# Patient Record
Sex: FEMALE | Race: WHITE | NOT HISPANIC OR LATINO | URBAN - METROPOLITAN AREA
[De-identification: names, ages, dates, MRNs, and addresses within clinical notes are randomized per-mention and may not be internally consistent; named-entity substitution may affect disease eponyms.]

---

## 2023-01-09 ENCOUNTER — APPOINTMENT (OUTPATIENT)
Dept: URBAN - METROPOLITAN AREA SURGERY 17 | Age: 38
Setting detail: DERMATOLOGY
End: 2023-01-10

## 2023-01-09 VITALS
WEIGHT: 175 LBS | SYSTOLIC BLOOD PRESSURE: 114 MMHG | DIASTOLIC BLOOD PRESSURE: 70 MMHG | TEMPERATURE: 98.9 F | HEART RATE: 76 BPM | HEIGHT: 66 IN | RESPIRATION RATE: 12 BRPM

## 2023-01-09 DIAGNOSIS — D485 NEOPLASM OF UNCERTAIN BEHAVIOR OF SKIN: ICD-10-CM

## 2023-01-09 DIAGNOSIS — Z80.8 FAMILY HISTORY OF MALIGNANT NEOPLASM OF OTHER ORGANS OR SYSTEMS: ICD-10-CM

## 2023-01-09 PROBLEM — C44.41 BASAL CELL CARCINOMA OF SKIN OF SCALP AND NECK: Status: ACTIVE | Noted: 2023-01-09

## 2023-01-09 PROBLEM — D48.5 NEOPLASM OF UNCERTAIN BEHAVIOR OF SKIN: Status: ACTIVE | Noted: 2023-01-09

## 2023-01-09 PROCEDURE — 99203 OFFICE O/P NEW LOW 30 MIN: CPT | Mod: 25

## 2023-01-09 PROCEDURE — OTHER MOHS SURGERY: OTHER

## 2023-01-09 PROCEDURE — OTHER MIPS QUALITY: OTHER

## 2023-01-09 PROCEDURE — OTHER CONSULTATION FOR MOHS SURGERY: OTHER

## 2023-01-09 PROCEDURE — OTHER COUNSELING: OTHER

## 2023-01-09 PROCEDURE — 17311 MOHS 1 STAGE H/N/HF/G: CPT

## 2023-01-09 PROCEDURE — 11102 TANGNTL BX SKIN SINGLE LES: CPT | Mod: 59

## 2023-01-09 PROCEDURE — OTHER BIOPSY BY SHAVE METHOD WITH FROZEN SECTION: OTHER

## 2023-01-09 PROCEDURE — 88331 PATH CONSLTJ SURG 1 BLK 1SPC: CPT | Mod: 59

## 2023-01-09 ASSESSMENT — PAIN INTENSITY VAS: HOW INTENSE IS YOUR PAIN 0 BEING NO PAIN, 10 BEING THE MOST SEVERE PAIN POSSIBLE?: NO PAIN

## 2023-01-09 ASSESSMENT — LOCATION SIMPLE DESCRIPTION DERM: LOCATION SIMPLE: SCALP

## 2023-01-09 ASSESSMENT — LOCATION ZONE DERM: LOCATION ZONE: SCALP

## 2023-01-09 ASSESSMENT — LOCATION DETAILED DESCRIPTION DERM: LOCATION DETAILED: LEFT SUPERIOR PARIETAL SCALP

## 2023-01-09 NOTE — PROCEDURE: BIOPSY BY SHAVE METHOD WITH FROZEN SECTION
Scc Poorly Differentiated Histology Text: Atypical squamous cells (keratinocytes) are present. Nests and cords of tumor are invading the dermis. The tumor is poorly differentiated.

## 2023-01-09 NOTE — PROCEDURE: BIOPSY BY SHAVE METHOD WITH FROZEN SECTION
Scc Histology Text: Atypical squamous cells (keratinocytes) are present. Nests and cords of tumor are invading the dermis.

## 2023-01-09 NOTE — PROCEDURE: BIOPSY BY SHAVE METHOD WITH FROZEN SECTION
Bcc Superficial Histology Text: Atypical hyperchromatic basaloid cells are present. The pattern is superficial BCC along the dermal-epidermal junction with superficial buds of tumor coming off the basal cell layer of the epidermis.

## 2023-01-09 NOTE — PROCEDURE: BIOPSY BY SHAVE METHOD WITH FROZEN SECTION
Scc Well Differentiated Histology Text: Atypical squamous cells (keratinocytes) are present. Nests and cords of tumor are invading the dermis. The tumor is well differentiated.

## 2023-01-09 NOTE — PROCEDURE: BIOPSY BY SHAVE METHOD WITH FROZEN SECTION
Post-Care Instructions: I reviewed with the patient in detail post-care instructions. The patient is to keep the biopsy site dry overnight, and then apply petrolatum daily until healed. A detailed handout for wound care was provided.

## 2023-01-09 NOTE — PROCEDURE: MOHS SURGERY

## 2023-01-09 NOTE — PROCEDURE: BIOPSY BY SHAVE METHOD WITH FROZEN SECTION
Scc Moderately Differentiated Histology Text: Atypical squamous cells (keratinocytes) are present. Nests and cords of tumor are invading the dermis. The tumor is moderately differentiated.

## 2023-01-09 NOTE — PROCEDURE: BIOPSY BY SHAVE METHOD WITH FROZEN SECTION
Intradermal Nevus Histology Text: There are nests of benign and regular appearing melanocytes in the dermis with normal maturation. There is no atypia or dysplasia.

## 2023-01-09 NOTE — PROCEDURE: BIOPSY BY SHAVE METHOD WITH FROZEN SECTION
Bcc Infiltrative Histology Text: Atypical hyperchromatic basaloid cells are present. The pattern is infiltrative in the dermis with cords, strands, and single cells infiltrating the dermis.

## 2023-01-09 NOTE — PROCEDURE: BIOPSY BY SHAVE METHOD WITH FROZEN SECTION
Scc In Situ Histology Text: Atypical squamous cells (keratinocytes) are present with full thickness atypia. Cells are enlarged with hyperchromatic nuclei. See map.

## 2023-01-09 NOTE — PROCEDURE: MOHS SURGERY

## 2023-01-09 NOTE — PROCEDURE: MOHS SURGERY

## 2023-01-09 NOTE — PROCEDURE: BIOPSY BY SHAVE METHOD WITH FROZEN SECTION
Bcc Histology Text: Atypical hyperchromatic basaloid cells are present. The pattern is nodular BCC invading the dermis with nests of tumor.

## 2023-01-09 NOTE — PROCEDURE: BIOPSY BY SHAVE METHOD WITH FROZEN SECTION
Bcc Micronodular Histology Text: Atypical hyperchromatic basaloid cells are present. The pattern is micronodular in the dermis with cords, strands, and single cells infiltrating the dermis.

## 2023-01-09 NOTE — PROCEDURE: BIOPSY BY SHAVE METHOD WITH FROZEN SECTION
Actinic Keratosis Histology Text: There is focal parakeratosis with associated loss of the granular layer and thickening of the epidermis. The underlying dermis often shows actinic elastosis and a mild chronic inflammatory infiltrate. There is some cytologic atypia in the basilar layer.

## 2023-01-09 NOTE — PROCEDURE: MIPS QUALITY
Detail Level: Detailed
Additional Notes: Patient states she does not take the Influenza vaccine.
Quality 110: Preventive Care And Screening: Influenza Immunization: Influenza Immunization not Administered for Documented Reasons.
Quality 226: Preventive Care And Screening: Tobacco Use: Screening And Cessation Intervention: Patient screened for tobacco use and is an ex/non-smoker
Quality 130: Documentation Of Current Medications In The Medical Record: Current Medications Documented

## 2023-01-09 NOTE — PROCEDURE: BIOPSY BY SHAVE METHOD WITH FROZEN SECTION
Scar Histology Text: The dermis contains a histologic scar with increased collagen fibers and a loss of adnexal structures. No tumor is identified.

## 2023-01-09 NOTE — PROCEDURE: BIOPSY BY SHAVE METHOD WITH FROZEN SECTION
Scc Ka Subtype Histology Text: Atypical squamous cells (keratinocytes) are present. Nests and cords of tumor are invading the dermis. The tumor is well differentiated with a central crateriform pattern of KA type.

## 2023-01-09 NOTE — PROCEDURE: BIOPSY BY SHAVE METHOD WITH FROZEN SECTION
Processing Note: The specimen was frozen in the cryostat, sectioned, and stained with H&E. Dr. Patton then read the pathology slides under the microscope.

## 2023-01-09 NOTE — PROCEDURE: BIOPSY BY SHAVE METHOD WITH FROZEN SECTION
Seborrheic Keratosis Histology Text: There is a proliferation of basaloid keratinocytes without atypia. Acanthosis and hyperkeratosis horn pseudocyst formation is present.

## 2023-01-09 NOTE — PROCEDURE: CONSULTATION FOR MOHS SURGERY
Detail Level: Detailed
Body Location Override (Optional - Billing Will Still Be Based On Selected Body Map Location If Applicable): Central scalp
Size Of Lesion: 0.4
Name Of The Referring Provider For Procedure: BROOKLYNN Saenz PA-C
Incorporate Mauc In Note: Yes

## 2023-01-09 NOTE — PROCEDURE: BIOPSY BY SHAVE METHOD WITH FROZEN SECTION
Body Location Override (Optional - Billing Will Still Be Based On Selected Body Map Location If Applicable): Posterior scalp

## 2023-01-09 NOTE — PROCEDURE: MOHS SURGERY
Body Location Override (Optional - Billing Will Still Be Based On Selected Body Map Location If Applicable): Central scalp

## 2024-06-11 NOTE — PROCEDURE: MOHS SURGERY
"Continued Stay Note  [facility]     Patient Name: Constantin Seals  MRN: [id number]  Today's Date: 6/11/2024    Admit Date: 5/31/2024    Plan: Home   Discharge Plan       Row Name 06/11/24 1048       Plan    Plan Home    Final Discharge Disposition Code 01 - home or self-care    Final Note Received consult: \"Patient needs include lifted toilet seat and shower chair.\", neither of these items are covered by insurance. Patient will need to purchase privately after discharge, no MD order will be needed.             CATRACHO Teran    " O-Z Plasty Text: The defect edges were debeveled with a #15 scalpel blade.  Given the location of the defect, shape of the defect and the proximity to free margins an O-Z plasty (double transposition flap) was deemed most appropriate.  Using a sterile surgical marker, the appropriate transposition flaps were drawn incorporating the defect and placing the expected incisions within the relaxed skin tension lines where possible.    The area thus outlined was incised deep to adipose tissue with a #15 scalpel blade.  The skin margins were undermined to an appropriate distance in all directions utilizing iris scissors.  Hemostasis was achieved with electrocautery.  The flaps were then transposed into place, one clockwise and the other counterclockwise, and anchored with interrupted buried subcutaneous sutures.